# Patient Record
(demographics unavailable — no encounter records)

---

## 2017-06-19 DIAGNOSIS — G50.0 TRIGEMINAL NEURALGIA: ICD-10-CM

## 2017-06-19 RX ORDER — CARBAMAZEPINE 100 MG/1
TABLET, CHEWABLE ORAL
Qty: 120 TABLET | Refills: 0 | Status: SHIPPED | OUTPATIENT
Start: 2017-06-19

## 2017-06-19 NOTE — TELEPHONE ENCOUNTER
Called patient to let her know that she should follow-up in clinic with Viviana Milian DNP as we have not seen her for follow since 2015. Patient is agreeable to this plan. Will have schedulers call patient to arrange an appointment with lab work prior. Patient encouraged to call with further questions or concerns. 30 days of medication refilled for patient.     Rachael Alamo RN